# Patient Record
Sex: MALE | Race: BLACK OR AFRICAN AMERICAN | Employment: OTHER | URBAN - METROPOLITAN AREA
[De-identification: names, ages, dates, MRNs, and addresses within clinical notes are randomized per-mention and may not be internally consistent; named-entity substitution may affect disease eponyms.]

---

## 2017-05-11 ENCOUNTER — OFFICE VISIT (OUTPATIENT)
Dept: FAMILY MEDICINE CLINIC | Age: 27
End: 2017-05-11
Payer: MEDICARE

## 2017-05-11 VITALS
DIASTOLIC BLOOD PRESSURE: 80 MMHG | HEART RATE: 86 BPM | WEIGHT: 226.8 LBS | HEIGHT: 69 IN | BODY MASS INDEX: 33.59 KG/M2 | SYSTOLIC BLOOD PRESSURE: 140 MMHG

## 2017-05-11 DIAGNOSIS — N45.1 EPIDIDYMITIS, RIGHT: Primary | ICD-10-CM

## 2017-05-11 DIAGNOSIS — E66.09 NON MORBID OBESITY DUE TO EXCESS CALORIES: ICD-10-CM

## 2017-05-11 DIAGNOSIS — Z71.6 TOBACCO ABUSE COUNSELING: ICD-10-CM

## 2017-05-11 DIAGNOSIS — J45.909 UNCOMPLICATED ASTHMA, UNSPECIFIED ASTHMA SEVERITY: ICD-10-CM

## 2017-05-11 PROCEDURE — 99214 OFFICE O/P EST MOD 30 MIN: CPT | Performed by: FAMILY MEDICINE

## 2017-05-11 RX ORDER — NAPROXEN 500 MG/1
500 TABLET ORAL 2 TIMES DAILY
COMMUNITY
Start: 2017-05-07 | End: 2017-08-28 | Stop reason: ALTCHOICE

## 2017-05-11 RX ORDER — CIPROFLOXACIN 500 MG/1
500 TABLET, FILM COATED ORAL
COMMUNITY
Start: 2017-05-07 | End: 2017-05-17

## 2017-05-11 ASSESSMENT — PATIENT HEALTH QUESTIONNAIRE - PHQ9
2. FEELING DOWN, DEPRESSED OR HOPELESS: 0
SUM OF ALL RESPONSES TO PHQ QUESTIONS 1-9: 0
1. LITTLE INTEREST OR PLEASURE IN DOING THINGS: 0
SUM OF ALL RESPONSES TO PHQ9 QUESTIONS 1 & 2: 0

## 2017-05-16 ASSESSMENT — ENCOUNTER SYMPTOMS
CONSTIPATION: 0
COUGH: 0
NAUSEA: 0
ABDOMINAL PAIN: 0
BLOOD IN STOOL: 0
SHORTNESS OF BREATH: 0
DIARRHEA: 0

## 2017-08-28 ENCOUNTER — OFFICE VISIT (OUTPATIENT)
Dept: FAMILY MEDICINE CLINIC | Age: 27
End: 2017-08-28
Payer: MEDICARE

## 2017-08-28 VITALS
SYSTOLIC BLOOD PRESSURE: 187 MMHG | HEIGHT: 69 IN | WEIGHT: 248 LBS | HEART RATE: 80 BPM | BODY MASS INDEX: 36.73 KG/M2 | DIASTOLIC BLOOD PRESSURE: 144 MMHG

## 2017-08-28 DIAGNOSIS — S93.422A SPRAIN, ANKLE JOINT, MEDIAL, LEFT, INITIAL ENCOUNTER: ICD-10-CM

## 2017-08-28 DIAGNOSIS — S60.221A CONTUSION OF RIGHT HAND, INITIAL ENCOUNTER: Primary | ICD-10-CM

## 2017-08-28 DIAGNOSIS — F17.201 TOBACCO ABUSE, IN REMISSION: ICD-10-CM

## 2017-08-28 DIAGNOSIS — J20.9 ACUTE LARYNGOTRACHEOBRONCHITIS: ICD-10-CM

## 2017-08-28 DIAGNOSIS — E66.09 NON MORBID OBESITY DUE TO EXCESS CALORIES: ICD-10-CM

## 2017-08-28 DIAGNOSIS — R05.3 PERSISTENT COUGH: ICD-10-CM

## 2017-08-28 DIAGNOSIS — J01.90 ACUTE RHINOSINUSITIS: ICD-10-CM

## 2017-08-28 DIAGNOSIS — J45.909 UNCOMPLICATED ASTHMA, UNSPECIFIED ASTHMA SEVERITY: ICD-10-CM

## 2017-08-28 DIAGNOSIS — J06.9 ACUTE URI: ICD-10-CM

## 2017-08-28 PROBLEM — J30.9 ALLERGIC RHINITIS: Status: ACTIVE | Noted: 2017-08-28

## 2017-08-28 PROBLEM — S93.429A SPRAIN, ANKLE JOINT, MEDIAL: Status: ACTIVE | Noted: 2017-08-28

## 2017-08-28 PROCEDURE — 99214 OFFICE O/P EST MOD 30 MIN: CPT | Performed by: FAMILY MEDICINE

## 2017-08-28 RX ORDER — AZITHROMYCIN 250 MG/1
250 TABLET, FILM COATED ORAL DAILY
Qty: 6 TABLET | Refills: 0 | Status: SHIPPED | OUTPATIENT
Start: 2017-08-28 | End: 2017-09-02

## 2017-08-28 RX ORDER — PROMETHAZINE HYDROCHLORIDE AND CODEINE PHOSPHATE 6.25; 1 MG/5ML; MG/5ML
5 SYRUP ORAL EVERY 4 HOURS PRN
Qty: 120 ML | Refills: 0 | Status: SHIPPED | OUTPATIENT
Start: 2017-08-28 | End: 2017-09-04

## 2017-09-06 ASSESSMENT — ENCOUNTER SYMPTOMS
NAUSEA: 0
CONSTIPATION: 0
SORE THROAT: 0
TROUBLE SWALLOWING: 0
ABDOMINAL PAIN: 0
COUGH: 1
SHORTNESS OF BREATH: 1
BACK PAIN: 0
DIARRHEA: 0
RHINORRHEA: 0
CHEST TIGHTNESS: 0

## 2017-09-07 ENCOUNTER — OFFICE VISIT (OUTPATIENT)
Dept: FAMILY MEDICINE CLINIC | Age: 27
End: 2017-09-07
Payer: MEDICARE

## 2017-09-07 VITALS
DIASTOLIC BLOOD PRESSURE: 118 MMHG | HEIGHT: 69 IN | SYSTOLIC BLOOD PRESSURE: 160 MMHG | WEIGHT: 251.2 LBS | HEART RATE: 77 BPM | BODY MASS INDEX: 37.2 KG/M2

## 2017-09-07 DIAGNOSIS — R05.3 PERSISTENT COUGH: Primary | ICD-10-CM

## 2017-09-07 DIAGNOSIS — R09.89 LABILE HYPERTENSION: ICD-10-CM

## 2017-09-07 DIAGNOSIS — J45.21 REACTIVE AIRWAY DISEASE, MILD INTERMITTENT, WITH ACUTE EXACERBATION: ICD-10-CM

## 2017-09-07 DIAGNOSIS — R53.83 FATIGUE, UNSPECIFIED TYPE: ICD-10-CM

## 2017-09-07 PROCEDURE — 99213 OFFICE O/P EST LOW 20 MIN: CPT | Performed by: FAMILY MEDICINE

## 2017-09-07 RX ORDER — ALBUTEROL SULFATE 90 UG/1
2 AEROSOL, METERED RESPIRATORY (INHALATION) EVERY 6 HOURS PRN
Qty: 1 INHALER | Refills: 1 | Status: SHIPPED | OUTPATIENT
Start: 2017-09-07

## 2017-09-07 RX ORDER — AMLODIPINE BESYLATE 5 MG/1
5 TABLET ORAL DAILY
Qty: 30 TABLET | Refills: 2 | Status: SHIPPED | OUTPATIENT
Start: 2017-09-07

## 2017-09-07 RX ORDER — PREDNISONE 20 MG/1
20 TABLET ORAL 2 TIMES DAILY
Qty: 14 TABLET | Refills: 0 | Status: SHIPPED | OUTPATIENT
Start: 2017-09-07 | End: 2017-09-14

## 2017-09-07 RX ORDER — GUAIFENESIN 100 MG/5ML
10 SYRUP ORAL 3 TIMES DAILY PRN
Qty: 120 ML | Refills: 1 | Status: SHIPPED | OUTPATIENT
Start: 2017-09-07

## 2017-09-15 ASSESSMENT — ENCOUNTER SYMPTOMS
CONSTIPATION: 0
NAUSEA: 0
ABDOMINAL PAIN: 0
SORE THROAT: 0
DIARRHEA: 0
BACK PAIN: 0
COUGH: 1
RHINORRHEA: 0
CHEST TIGHTNESS: 0
TROUBLE SWALLOWING: 0
SHORTNESS OF BREATH: 1

## 2018-06-18 DIAGNOSIS — S93.422A SPRAIN, ANKLE JOINT, MEDIAL, LEFT, INITIAL ENCOUNTER: ICD-10-CM

## 2021-09-04 ENCOUNTER — APPOINTMENT (OUTPATIENT)
Dept: GENERAL RADIOLOGY | Facility: CLINIC | Age: 31
End: 2021-09-04

## 2021-09-04 ENCOUNTER — HOSPITAL ENCOUNTER (EMERGENCY)
Facility: CLINIC | Age: 31
Discharge: HOME | End: 2021-09-04
Attending: EMERGENCY MEDICINE

## 2021-09-04 VITALS
HEART RATE: 71 BPM | SYSTOLIC BLOOD PRESSURE: 173 MMHG | HEIGHT: 68 IN | WEIGHT: 262 LBS | TEMPERATURE: 98 F | RESPIRATION RATE: 16 BRPM | DIASTOLIC BLOOD PRESSURE: 108 MMHG | BODY MASS INDEX: 39.71 KG/M2 | OXYGEN SATURATION: 100 %

## 2021-09-04 DIAGNOSIS — S91.311A LACERATION OF RIGHT FOOT, INITIAL ENCOUNTER: Primary | ICD-10-CM

## 2021-09-04 PROCEDURE — 73630 X-RAY EXAM OF FOOT: CPT

## 2021-09-04 PROCEDURE — 6360000002 HC RX W HCPCS: Performed by: EMERGENCY MEDICINE

## 2021-09-04 PROCEDURE — 90715 TDAP VACCINE 7 YRS/> IM: CPT | Performed by: EMERGENCY MEDICINE

## 2021-09-04 PROCEDURE — 90471 IMMUNIZATION ADMIN: CPT | Performed by: EMERGENCY MEDICINE

## 2021-09-04 PROCEDURE — 6370000000 HC RX 637 (ALT 250 FOR IP): Performed by: EMERGENCY MEDICINE

## 2021-09-04 PROCEDURE — 99285 EMERGENCY DEPT VISIT HI MDM: CPT

## 2021-09-04 RX ORDER — IBUPROFEN 800 MG/1
800 TABLET ORAL ONCE
Status: COMPLETED | OUTPATIENT
Start: 2021-09-04 | End: 2021-09-04

## 2021-09-04 RX ORDER — METOPROLOL TARTRATE 50 MG/1
25 TABLET, FILM COATED ORAL ONCE
Status: COMPLETED | OUTPATIENT
Start: 2021-09-04 | End: 2021-09-04

## 2021-09-04 RX ADMIN — METOPROLOL TARTRATE 25 MG: 50 TABLET, FILM COATED ORAL at 04:59

## 2021-09-04 RX ADMIN — IBUPROFEN 800 MG: 800 TABLET, FILM COATED ORAL at 04:59

## 2021-09-04 RX ADMIN — TETANUS TOXOID, REDUCED DIPHTHERIA TOXOID AND ACELLULAR PERTUSSIS VACCINE, ADSORBED 0.5 ML: 5; 2.5; 8; 8; 2.5 SUSPENSION INTRAMUSCULAR at 03:19

## 2021-09-04 ASSESSMENT — PAIN DESCRIPTION - LOCATION: LOCATION: FOOT

## 2021-09-04 ASSESSMENT — PAIN DESCRIPTION - ORIENTATION: ORIENTATION: RIGHT

## 2021-09-04 ASSESSMENT — PAIN SCALES - GENERAL: PAINLEVEL_OUTOF10: 2

## 2021-09-09 NOTE — ED PROVIDER NOTES
eMERGENCY dEPARTMENT eNCOUnter      Pt Name: Sherita Kwong  MRN: [id number]  [place] 1990  Date of evaluation: 9/4/2021      CHIEF COMPLAINT       Chief Complaint   Patient presents with    Laceration         HISTORY OF PRESENT ILLNESS    Sherita Kwong is a 26 y.o. male who presents for laceration. Patient states he had sustained a laceration 24 hours ago to his right foot he is here for evaluation denies any numbness tingling weakness states immunizations are up-to-date        REVIEW OF SYSTEMS       Positive for laceration negative for numbness tingling weakness or loss of function    PAST MEDICAL HISTORY    has a past medical history of Asthma, Hypertension, and Sciatica, right side. SURGICAL HISTORY      has a past surgical history that includes sinus surgery and Tonsillectomy. CURRENT MEDICATIONS       Discharge Medication List as of 9/4/2021  4:33 AM      CONTINUE these medications which have NOT CHANGED    Details   guaiFENesin (ALTARUSSIN) 100 MG/5ML syrup Take 10 mLs by mouth 3 times daily as needed for Cough, Disp-120 mL, R-1Normal      amLODIPine (NORVASC) 5 MG tablet Take 1 tablet by mouth daily, Disp-30 tablet, R-2Normal      albuterol sulfate HFA (PROAIR HFA) 108 (90 Base) MCG/ACT inhaler Inhale 2 puffs into the lungs every 6 hours as needed for Wheezing, Disp-1 Inhaler, R-1Normal             ALLERGIES     is allergic to benadryl [diphenhydramine], doxycycline, flonase [fluticasone propionate], pcn [penicillins], and banana. FAMILY HISTORY     has no family status information on file. family history is not on file. SOCIAL HISTORY      reports that he quit smoking about 5 years ago. His smoking use included cigarettes. He has never used smokeless tobacco. He reports current alcohol use. He reports that he does not use drugs. PHYSICAL EXAM     INITIAL VITALS:  height is 5' 8.25\" (1.734 m) and weight is 118.8 kg (262 lb).  His oral temperature is 98 °F (36.7 °C). His blood pressure is 173/108 (abnormal) and his pulse is 71. His respiration is 16 and oxygen saturation is 100%. General: Patient alert nontoxic-appearing male in no apparent distress  HEENT: Head is atraumatic  respiratory: Patient has nonlabored respirations  extremity: Examination of right lower extremity reveals a laceration between the second and third toe no active bleeding at this time no signs of infection sensation is intact tendon function is tested found to be intact    DIFFERENTIAL DIAGNOSIS/ MDM:     Laceration    DIAGNOSTIC RESULTS     EKG: All EKG's are interpreted by the Emergency Department Physician who either signs or Co-signs this chart in the absence of a cardiologist.        RADIOLOGY:   I directly visualized the following  images and reviewed the radiologist interpretations:  XR FOOT RIGHT (MIN 3 VIEWS)   Final Result   Unremarkable radiographic views of the foot. No evidence of radiopaque foreign body.                LABS:  Labs Reviewed - No data to display      EMERGENCY DEPARTMENT COURSE:   Vitals:    Vitals:    09/04/21 0315 09/04/21 0440 09/04/21 0514 09/04/21 0526   BP: (!) 169/114 (!) 176/133 (!) 147/113 (!) 173/108   Pulse:       Resp:    16   Temp:       TempSrc:       SpO2: 92% 97% 91% 100%   Weight:       Height:         -------------------------  BP: (!) 173/108, Temp: 98 °F (36.7 °C), Pulse: 71, Resp: 16    Orders Placed This Encounter   Medications    Tetanus-Diphth-Acell Pertussis (BOOSTRIX) injection 0.5 mL    metoprolol tartrate (LOPRESSOR) tablet 25 mg    ibuprofen (ADVIL;MOTRIN) tablet 800 mg           Re-evaluation Notes    Patient states he had cut it on a piece of glass and x-ray was done to rule out foreign body x-ray per radiologist shows no obvious foreign body at this time the wound is greater than 24 hours old we will leave it open there is no active bleeding he was instructed topical antibiotic ointment to the area he was given some low pressure here for his blood pressure told to follow-up with his primary for blood pressure control return if any problems were to arise    CRITICAL CARE:   None      CONSULTS:      PROCEDURES:  None    FINAL IMPRESSION      1. Laceration of right foot, initial encounter          DISPOSITION/PLAN   DISPOSITION Decision To Discharge 09/04/2021 04:33:09 AM      Condition on Disposition    Stable    PATIENT REFERRED TO:  CANDELARIO Morin  [phone]      As needed      DISCHARGE MEDICATIONS:  Discharge Medication List as of 9/4/2021  4:33 AM          (Please note that portions of this note were completed with a voice recognition program.  Efforts were made to edit the dictations but occasionally words are mis-transcribed.)    Everardo Desir MD,, MD, F.A.C.E.P.   Attending Emergency Physician        Everardo Desir MD  09/09/21 2167

## 2021-11-30 ENCOUNTER — HOSPITAL ENCOUNTER (EMERGENCY)
Age: 31
Discharge: HOME | End: 2021-12-01
Attending: EMERGENCY MEDICINE

## 2021-11-30 VITALS
HEIGHT: 68 IN | RESPIRATION RATE: 16 BRPM | OXYGEN SATURATION: 98 % | TEMPERATURE: 98.4 F | SYSTOLIC BLOOD PRESSURE: 150 MMHG | HEART RATE: 91 BPM | WEIGHT: 262 LBS | DIASTOLIC BLOOD PRESSURE: 99 MMHG | BODY MASS INDEX: 39.71 KG/M2

## 2021-11-30 DIAGNOSIS — G43.009 MIGRAINE WITHOUT AURA AND WITHOUT STATUS MIGRAINOSUS, NOT INTRACTABLE: Primary | ICD-10-CM

## 2021-11-30 PROCEDURE — 96372 THER/PROPH/DIAG INJ SC/IM: CPT

## 2021-11-30 PROCEDURE — 99283 EMERGENCY DEPT VISIT LOW MDM: CPT

## 2021-11-30 ASSESSMENT — PAIN DESCRIPTION - DESCRIPTORS: DESCRIPTORS: CONSTANT;DISCOMFORT

## 2021-11-30 ASSESSMENT — PAIN SCALES - GENERAL: PAINLEVEL_OUTOF10: 4

## 2021-11-30 ASSESSMENT — PAIN DESCRIPTION - LOCATION: LOCATION: HEAD

## 2021-11-30 ASSESSMENT — PAIN DESCRIPTION - PAIN TYPE: TYPE: ACUTE PAIN

## 2021-11-30 ASSESSMENT — PAIN DESCRIPTION - FREQUENCY: FREQUENCY: CONTINUOUS

## 2021-12-01 PROCEDURE — 6360000002 HC RX W HCPCS: Performed by: EMERGENCY MEDICINE

## 2021-12-01 PROCEDURE — 6370000000 HC RX 637 (ALT 250 FOR IP): Performed by: EMERGENCY MEDICINE

## 2021-12-01 RX ORDER — METOCLOPRAMIDE 10 MG/1
10 TABLET ORAL ONCE
Status: COMPLETED | OUTPATIENT
Start: 2021-12-01 | End: 2021-12-01

## 2021-12-01 RX ORDER — KETOROLAC TROMETHAMINE 30 MG/ML
30 INJECTION, SOLUTION INTRAMUSCULAR; INTRAVENOUS ONCE
Status: COMPLETED | OUTPATIENT
Start: 2021-12-01 | End: 2021-12-01

## 2021-12-01 RX ORDER — ACETAMINOPHEN 500 MG
1000 TABLET ORAL ONCE
Status: COMPLETED | OUTPATIENT
Start: 2021-12-01 | End: 2021-12-01

## 2021-12-01 RX ADMIN — METOCLOPRAMIDE 10 MG: 10 TABLET ORAL at 01:23

## 2021-12-01 RX ADMIN — ACETAMINOPHEN 1000 MG: 500 TABLET ORAL at 01:24

## 2021-12-01 RX ADMIN — KETOROLAC TROMETHAMINE 30 MG: 30 INJECTION, SOLUTION INTRAMUSCULAR at 01:24

## 2021-12-01 ASSESSMENT — PAIN SCALES - GENERAL: PAINLEVEL_OUTOF10: 4

## 2021-12-01 NOTE — ED PROVIDER NOTES
EMERGENCY DEPARTMENT ENCOUNTER    Pt Name: Jesus Manuel Mcelroy  MRN: [id number]  [place] 1990  Date of evaluation: 21  CHIEF COMPLAINT       Chief Complaint   Patient presents with    Headache     6-7 days/ denies nausea, vomiting, fever     HISTORY OF PRESENT ILLNESS   Patient is a 17-year-old male who presents to the ED complaining of migraine headache. Headache started gradually approximately 1 week ago. Headache located over temples bilaterally. He did not try taking any for analgesia. He was able to go running today and he felt well however because of his asthma he states that he coughed up a small amount of phlegm. He states his sleep has not been the greatest over the past few days. No other issues at this time. ROS:  No fevers, cough, shortness of breath, chest pain, abdominal pain, nausea, vomiting, changes in urine or stool. REVIEW OF SYSTEMS     Review of Systems   All other systems reviewed and are negative. PASTMEDICAL HISTORY     Past Medical History:   Diagnosis Date    Asthma     Hypertension     Sciatica, right side      SURGICAL HISTORY       Past Surgical History:   Procedure Laterality Date    SINUS SURGERY      TONSILLECTOMY       CURRENT MEDICATIONS       Previous Medications    ALBUTEROL SULFATE HFA (PROAIR HFA) 108 (90 BASE) MCG/ACT INHALER    Inhale 2 puffs into the lungs every 6 hours as needed for Wheezing    AMLODIPINE (NORVASC) 5 MG TABLET    Take 1 tablet by mouth daily    GUAIFENESIN (ALTARUSSIN) 100 MG/5ML SYRUP    Take 10 mLs by mouth 3 times daily as needed for Cough     ALLERGIES     is allergic to benadryl [diphenhydramine], doxycycline, flonase [fluticasone propionate], pcn [penicillins], and banana. FAMILY HISTORY     has no family status information on file.       SOCIAL HISTORY       Social History     Tobacco Use    Smoking status: Former Smoker     Types: Cigarettes     Quit date: 2016     Years since quittin.4    Smokeless tobacco: Never Used   Substance Use Topics    Alcohol use: Yes     Alcohol/week: 0.0 standard drinks     Comment: socially    Drug use: No     Types: Marijuana (Weed)     PHYSICAL EXAM     INITIAL VITALS: BP (!) 150/99   Pulse 91   Temp 98.4 °F (36.9 °C) (Oral)   Resp 16   Ht 5' 8\" (1.727 m)   Wt 262 lb (118.8 kg)   SpO2 98%   BMI 39.84 kg/m²    Physical Exam  HENT:      Head: Normocephalic. Right Ear: External ear normal.      Left Ear: External ear normal.      Nose: Nose normal.   Eyes:      Extraocular Movements: Extraocular movements intact. Conjunctiva/sclera: Conjunctivae normal.      Pupils: Pupils are equal, round, and reactive to light. Cardiovascular:      Rate and Rhythm: Normal rate. Pulmonary:      Effort: Pulmonary effort is normal.   Abdominal:      General: Abdomen is flat. Skin:     General: Skin is dry. Neurological:      Mental Status: He is alert. Mental status is at baseline. Psychiatric:         Mood and Affect: Mood normal.         Behavior: Behavior normal.         MEDICAL DECISION MAKING:   The patient is hemodynamically stable, afebrile, nontoxic-appearing. Physical exam unremarkable. Based on history and exam likely acute on chronic migraine headache. Low suspicion for space-occupying lesion. ED plan for Toradol, Reglan, Tylenol, discharge. DIAGNOSTIC RESULTS   EKG:All EKG's are interpreted by the Emergency Department Physician who either signs or Co-signs this chart in the absence of a cardiologist.        RADIOLOGY:All plain film, CT, MRI, and formal ultrasound images (except ED bedside ultrasound) are read by the radiologist, see reports below, unless otherwisenoted in MDM or here. No orders to display     LABS: All lab results were reviewed by myself, and all abnormals are listed below. Labs Reviewed - No data to display    EMERGENCY DEPARTMENTCOURSE:   Patient did well in the ED. Headache improved with treatment plan.   No further work-up indicated at this time. Nursing notes reviewed. At this time this is what I find, the patient appears well and does not appear sick or toxic. I gave my usual and customary discussion of the risks and benefits of discharge versus admission. I answered the patient's questions. I gave the patient strict return precautions. Patient expressed understanding of the discharge instructions. The care is provided during an unprecedented national emergency due to the novel coronavirus, COVID-19. Dictated but not reviewed. Vitals:    Vitals:    11/30/21 2356   BP: (!) 150/99   Pulse: 91   Resp: 16   Temp: 98.4 °F (36.9 °C)   TempSrc: Oral   SpO2: 98%   Weight: 262 lb (118.8 kg)   Height: 5' 8\" (1.727 m)       The patient was given the following medications while in the emergency department:  Orders Placed This Encounter   Medications    metoclopramide (REGLAN) tablet 10 mg    ketorolac (TORADOL) injection 30 mg    acetaminophen (TYLENOL) tablet 1,000 mg     CONSULTS:  None    FINAL IMPRESSION      1.  Migraine without aura and without status migrainosus, not intractable          DISPOSITION/PLAN   DISPOSITION Decision To Discharge 12/01/2021 01:02:52 AM      PATIENT REFERRED TO:  Talisha Buchanan PA-C  [facility] [id number]  [phone]    In 2 days      DISCHARGE MEDICATIONS:  New Prescriptions    No medications on file     Luann Shea MD  Attending Emergency Physician               Carlito Martin MD  12/01/21 4997